# Patient Record
Sex: MALE | Race: WHITE
[De-identification: names, ages, dates, MRNs, and addresses within clinical notes are randomized per-mention and may not be internally consistent; named-entity substitution may affect disease eponyms.]

---

## 2020-01-31 ENCOUNTER — HOSPITAL ENCOUNTER (OUTPATIENT)
Dept: HOSPITAL 35 - CV | Age: 75
End: 2020-01-31
Attending: INTERNAL MEDICINE
Payer: COMMERCIAL

## 2020-01-31 DIAGNOSIS — I08.1: Primary | ICD-10-CM

## 2020-01-31 DIAGNOSIS — I48.91: ICD-10-CM

## 2020-01-31 NOTE — 2DMMODE
[facility]
[place]   [address]                   2 D/M-MODE ECHOCARDIOGRAM     
_______________________________________________________________________________
 
Name:       EMANI VIVEROS              Room #:                     REG [facility].#:      [id number]                       Account #:      [id number]  
Admission:  01/31/20    Attend Phys:    Doyle Rivas MD     
Discharge:              Date of Birth:  [birth year]  
                                                          Report #: [id number]
                                                                    [id number]
_______________________________________________________________________________
THIS REPORT FOR:  
 
cc:  Doyle Rivas MD, David A. MD Lundgren, Craig H. MD [facility]                                        
                                                                       ~
THIS REPORT FOR:   //name//                          
 
 
--------------- APPROVED REPORT --------------
 
 
Study performed:  01/31/2020 10:53:18
 
EXAM: Comprehensive 2D, Doppler, and color-flow 
Echocardiogram 
Patient Location: Out-Patient   
Room #:  Echo lab 2    Status:  routine
 
        BSA:         2.31
HR: 87 bpm   BP:          132/74 mmHg 
Rhythm: Atrial Fibrillation     
 
Other Information 
Study Quality: Good
 
Indications
Atrial Fibrillation
 
2D Dimensions
RVDd:  29.90 mm  
IVSd:  8.43 (7-11mm) LVOT Diam:  21.70 (18-24mm) 
LVDd:  50.81 mm  
PWd:  8.86 (7-11mm) Ascending Ao:  33.51 (22-36mm)
LVDs:  36.12 (25-40mm) 
Aortic Root:  37.95 mm IVC:  15.00 mm
 
Volumes
Left Atrial Volume (Systole) 
Single Plane 4CH:  100.48 mL Single Plane 2CH:  94.69 mL
    LA ESV Index:  48.00 mL/m2
 
Aortic Valve
AoV Peak Blair.:  1.36 m/s 
AO Peak Gr.:  7.40 mmHg  LVOT Max PG:  3.64 mmHg
    LVOT Max V:  0.95 m/s
 
 
[facility]
[address]
[place]  [address]
Phone:  [phone] 2 D/M-MODE ECHOCARDIOGRAM     
_______________________________________________________________________________
 
Name:            EMANI VIVEROS              Room #:                    REG CL
[facility]#:           [id number]          Account #:     [id number]  
Admission:       01/31/20         Attend Phys:   Doyle Rivas MD 
Discharge:                  Date of Birth: [birth year]  
                         Report #:      [id number]
        [id number]
_______________________________________________________________________________
MARCELL Vmax: 2.59 cm2  
 
Pulmonary Valve
PV Peak Blair.:  1.00 m/s PV Peak Gr.:  4.06 mmHg
 
Tricuspid Valve
TR Peak Blair.:  2.68 m/s  
TR Peak Gr.:  28.77 mmHg 
    PA Pressure:  34.00 mmHg
 
Left Ventricle
The left ventricle is normal size. There is normal LV segmental wall 
motion. There is normal left ventricular wall thickness. The left 
ventricular systolic function is normal. The left ventricular 
ejection fraction is within the normal range. LVEF is 55%. This study 
is not technically sufficient to allow evaluation of the LV diastolic 
function due to atrial fibrillation.
 
Right Ventricle
The right ventricle is normal size. The right ventricular systolic 
function is normal.
 
Atria
Left atrium is dilated. The right atrium size is normal.
 
Aortic Valve
The aortic valve is normal in structure. Trace aortic regurgitation. 
There is no aortic valvular stenosis.
 
Mitral Valve
The mitral valve is normal in structure. Mild mitral regurgitation. 
No evidence of mitral valve stenosis.
 
Tricuspid Valve
The tricuspid valve is normal in structure. There is mild tricuspid 
regurgitation. Estimated PAP 35 mmHg. 
 
Pulmonic Valve
The pulmonary valve is normal in structure. Trace pulmonic 
regurgitation.
 
Great Vessels
The aortic root is normal in size. IVC is normal in size and 
collapses >50% with inspiration.
 
Pericardium
 
 
[facility]
[address]
[place]  [address]
Phone:  [phone]                    2 D/M-MODE ECHOCARDIOGRAM     
_______________________________________________________________________________
 
Name:            EMANI VIVEROS              Room #:                    REG [facility]#:           [id number]          Account #:     [id number]  
Admission:       01/31/20         Attend Phys:   Doyle Rivas MD 
Discharge:                  Date of Birth: [birth year]  
                         Report #:      [id number]
        [id number]
_______________________________________________________________________________
There is no pericardial effusion.
 
<Conclusion>
The left ventricular systolic function is normal.
There is normal LV segmental wall motion.
LVEF is 55%.
Left atrium is dilated.
The aortic valve is normal in structure. Trace aortic regurgitation, 
no stenosis.
The mitral valve is normal in structure. Mild mitral 
regurgitation.
There is mild tricuspid regurgitation. Estimated pulmonary artery 
pressure of 35 mmHg.
There is no pericardial effusion.
 
 
 
 
 
 
 
 
 
 
 
 
 
 
 
 
 
 
 
 
 
 
 
 
 
 
 
 
 
 
  <ELECTRONICALLY SIGNED>
   By: Reji Kaplan MD, [facility]   
  01/31/20     1227
D: 01/31/20 1227                           _____________________________________
T: 01/31/20 1227                           Reji Kaplan MD, FACC     /INF